# Patient Record
Sex: FEMALE | URBAN - METROPOLITAN AREA
[De-identification: names, ages, dates, MRNs, and addresses within clinical notes are randomized per-mention and may not be internally consistent; named-entity substitution may affect disease eponyms.]

---

## 2018-03-14 ENCOUNTER — SEE NOTE (OUTPATIENT)
Dept: URBAN - METROPOLITAN AREA CLINIC 32 | Facility: CLINIC | Age: 68
Setting detail: DERMATOLOGY
End: 2018-03-14

## 2018-03-14 ENCOUNTER — RX ONLY (RX ONLY)
Age: 68
End: 2018-03-14

## 2018-03-14 PROBLEM — L82.0 INFLAMED SEBORRHEIC KERATOSIS: Status: RESOLVED | Noted: 2018-03-14

## 2018-03-14 PROBLEM — L82.0 INFLAMED SEBORRHEIC KERATOSIS: Status: ACTIVE | Noted: 2018-03-14

## 2018-03-14 PROCEDURE — 17110 DESTRUCT B9 LESION 1-14: CPT

## 2018-03-14 RX ORDER — CICLOPIROX 1 G/100ML
1 APPLICATION SHAMPOO TOPICAL DAILY
Qty: 120 | Refills: 2
Start: 2018-03-14

## 2018-03-14 RX ORDER — CLOBETASOL PROPIONATE 0.5 MG/ML
1 APPLICATION SOLUTION TOPICAL QHS
Qty: 50 | Refills: 2
Start: 2018-03-14

## 2018-04-11 ENCOUNTER — FORNEY FOLLOW UP - SEE NOTE (OUTPATIENT)
Dept: URBAN - METROPOLITAN AREA CLINIC 32 | Facility: CLINIC | Age: 68
Setting detail: DERMATOLOGY
End: 2018-04-11

## 2018-04-11 PROBLEM — L82.0 INFLAMED SEBORRHEIC KERATOSIS: Status: RESOLVED | Noted: 2018-04-11

## 2018-04-11 PROBLEM — L82.0 INFLAMED SEBORRHEIC KERATOSIS: Status: ACTIVE | Noted: 2018-04-11

## 2018-04-11 PROCEDURE — 99213 OFFICE O/P EST LOW 20 MIN: CPT

## 2019-02-13 ENCOUNTER — IPL - FACE (BH) (OUTPATIENT)
Dept: URBAN - METROPOLITAN AREA CLINIC 31 | Facility: CLINIC | Age: 69
Setting detail: DERMATOLOGY
End: 2019-02-13

## 2019-02-13 DIAGNOSIS — D48.5 NEOPLASM OF UNCERTAIN BEHAVIOR OF SKIN: ICD-10-CM

## 2019-02-13 PROCEDURE — OTHER IPL LASER: OTHER

## 2020-07-24 ENCOUNTER — TELEPHONE ENCOUNTER (OUTPATIENT)
Dept: URBAN - METROPOLITAN AREA CLINIC 92 | Facility: CLINIC | Age: 70
End: 2020-07-24

## 2020-07-24 RX ORDER — POLYETHYLENE GLYCOL 3350, SODIUM CHLORIDE, SODIUM BICARBONATE AND POTASSIUM CHLORIDE 420G
AS DIRECTED KIT ORAL
Qty: 1 BOTTLE | Refills: 0 | OUTPATIENT
Start: 2020-07-24

## 2020-08-03 ENCOUNTER — TELEPHONE ENCOUNTER (OUTPATIENT)
Dept: URBAN - METROPOLITAN AREA CLINIC 98 | Facility: CLINIC | Age: 70
End: 2020-08-03

## 2020-09-23 ENCOUNTER — OFFICE VISIT (OUTPATIENT)
Dept: URBAN - METROPOLITAN AREA MEDICAL CENTER 28 | Facility: MEDICAL CENTER | Age: 70
End: 2020-09-23
Payer: MEDICARE

## 2020-09-23 DIAGNOSIS — Z86.010 H/O ADENOMATOUS POLYP OF COLON: ICD-10-CM

## 2020-09-23 DIAGNOSIS — K63.89 BACTERIAL OVERGROWTH SYNDROME: ICD-10-CM

## 2020-09-23 DIAGNOSIS — K63.5 BENIGN COLON POLYP: ICD-10-CM

## 2020-09-23 PROCEDURE — 45385 COLONOSCOPY W/LESION REMOVAL: CPT | Performed by: INTERNAL MEDICINE

## 2020-09-23 PROCEDURE — G9936 PMH PLYP/NEO CO/RECT/JUN/ANS: HCPCS | Performed by: INTERNAL MEDICINE

## 2020-09-23 RX ORDER — POLYETHYLENE GLYCOL 3350, SODIUM CHLORIDE, SODIUM BICARBONATE AND POTASSIUM CHLORIDE 420G
AS DIRECTED KIT ORAL
Qty: 1 BOTTLE | Refills: 0 | Status: ACTIVE | COMMUNITY
Start: 2020-07-24

## 2020-09-23 RX ORDER — EXTENDED PHENYTOIN SODIUM 30 MG/1
CAPSULE ORAL
Qty: 0 | Refills: 0 | Status: ACTIVE | COMMUNITY
Start: 1900-01-01 | End: 1900-01-01

## 2020-09-28 ENCOUNTER — TELEPHONE ENCOUNTER (OUTPATIENT)
Dept: URBAN - METROPOLITAN AREA CLINIC 92 | Facility: CLINIC | Age: 70
End: 2020-09-28

## 2021-06-16 ENCOUNTER — NUMB FOR INJECTABLES (OUTPATIENT)
Dept: URBAN - METROPOLITAN AREA CLINIC 32 | Facility: CLINIC | Age: 71
Setting detail: DERMATOLOGY
End: 2021-06-16

## 2021-06-16 DIAGNOSIS — L57.0 ACTINIC KERATOSIS: ICD-10-CM

## 2021-06-16 PROBLEM — L82.1 OTHER SEBORRHEIC KERATOSIS: Status: ACTIVE | Noted: 2021-06-16

## 2021-06-16 PROBLEM — D18.01 HEMANGIOMA OF SKIN AND SUBCUTANEOUS TISSUE: Status: RESOLVED | Noted: 2021-06-16

## 2021-06-16 PROBLEM — L85.3 XEROSIS CUTIS: Status: RESOLVED | Noted: 2021-06-16

## 2021-06-16 PROBLEM — L82.1 OTHER SEBORRHEIC KERATOSIS: Status: RESOLVED | Noted: 2021-06-16

## 2021-06-16 PROBLEM — L85.3 XEROSIS CUTIS: Status: ACTIVE | Noted: 2021-06-16

## 2021-06-16 PROBLEM — D18.01 HEMANGIOMA OF SKIN AND SUBCUTANEOUS TISSUE: Status: ACTIVE | Noted: 2021-06-16

## 2021-06-16 PROCEDURE — 99214 OFFICE O/P EST MOD 30 MIN: CPT

## 2022-01-19 ENCOUNTER — OUT OF OFFICE VISIT (OUTPATIENT)
Dept: URBAN - METROPOLITAN AREA MEDICAL CENTER 33 | Facility: MEDICAL CENTER | Age: 72
End: 2022-01-19
Payer: MEDICARE

## 2022-01-19 DIAGNOSIS — E87.1 ACUTE HYPONATREMIA: ICD-10-CM

## 2022-01-19 DIAGNOSIS — R79.89 ABNORMAL BILIRUBIN TEST: ICD-10-CM

## 2022-01-19 DIAGNOSIS — R50.9 ACUTE FEBRILE ILLNESS: ICD-10-CM

## 2022-01-19 DIAGNOSIS — R74.01 ABNORMAL/ELEVATED TRANSAMINASE (SGOT, AMINOTRANSFERASE): ICD-10-CM

## 2022-01-19 PROCEDURE — G8427 DOCREV CUR MEDS BY ELIG CLIN: HCPCS | Performed by: INTERNAL MEDICINE

## 2022-01-19 PROCEDURE — 99223 1ST HOSP IP/OBS HIGH 75: CPT | Performed by: INTERNAL MEDICINE

## 2022-01-20 ENCOUNTER — OUT OF OFFICE VISIT (OUTPATIENT)
Dept: URBAN - METROPOLITAN AREA MEDICAL CENTER 33 | Facility: MEDICAL CENTER | Age: 72
End: 2022-01-20
Payer: MEDICARE

## 2022-01-20 DIAGNOSIS — R74.01 ABNORMAL/ELEVATED TRANSAMINASE (SGOT, AMINOTRANSFERASE): ICD-10-CM

## 2022-01-20 PROCEDURE — 99232 SBSQ HOSP IP/OBS MODERATE 35: CPT | Performed by: INTERNAL MEDICINE

## 2022-02-09 ENCOUNTER — P2P PATIENT RECORD (OUTPATIENT)
Age: 72
End: 2022-02-09

## 2022-02-09 ENCOUNTER — DASHBOARD ENCOUNTERS (OUTPATIENT)
Age: 72
End: 2022-02-09

## 2022-02-11 ENCOUNTER — OUT OF OFFICE VISIT (OUTPATIENT)
Dept: URBAN - METROPOLITAN AREA MEDICAL CENTER 33 | Facility: MEDICAL CENTER | Age: 72
End: 2022-02-11

## 2022-02-11 ENCOUNTER — OFFICE VISIT (OUTPATIENT)
Dept: URBAN - METROPOLITAN AREA CLINIC 98 | Facility: CLINIC | Age: 72
End: 2022-02-11

## 2022-02-11 ENCOUNTER — CLAIMS CREATED FROM THE CLAIM WINDOW (OUTPATIENT)
Dept: URBAN - METROPOLITAN AREA MEDICAL CENTER 33 | Facility: MEDICAL CENTER | Age: 72
End: 2022-02-11
Payer: MEDICARE

## 2022-02-11 DIAGNOSIS — D64.89 ANEMIA DUE TO OTHER CAUSE: ICD-10-CM

## 2022-02-11 DIAGNOSIS — R93.5 ABDOMINAL ULTRASOUND, ABNORMAL: ICD-10-CM

## 2022-02-11 DIAGNOSIS — R11.2 ACUTE NAUSEA WITH NONBILIOUS VOMITING: ICD-10-CM

## 2022-02-11 DIAGNOSIS — Z12.11 AVERAGE RISK FOR CRC. DUE TO PT'S CO-MORBID STATE WITH END STAGE DEMENTIA, HIGH RISK FOR ANESTHESIA (PER NEUROLOGY); INABILITY TO TAKE A BOWEL PREP....WOULD NOT ADVISE ANY COLORECTAL CANCER SCREENING INCLUDING STOOL TEST FOR FECAL BLOOD.: ICD-10-CM

## 2022-02-11 DIAGNOSIS — R10.13 ABDOMINAL DISCOMFORT, EPIGASTRIC: ICD-10-CM

## 2022-02-11 PROCEDURE — 99222 1ST HOSP IP/OBS MODERATE 55: CPT | Performed by: INTERNAL MEDICINE

## 2022-02-11 PROCEDURE — G8427 DOCREV CUR MEDS BY ELIG CLIN: HCPCS | Performed by: INTERNAL MEDICINE

## 2022-02-11 PROCEDURE — 996 AG2 (NON BILLABLE): Performed by: PHYSICIAN ASSISTANT

## 2022-02-11 NOTE — HPI-TODAY'S VISIT:
Here to follow up for elev liver tests seen prev by Dr Mathews.   had liver test elevations on admission 1/2022 for LLL CAP and COVID 19.  alt 69 ast 74   alp 156 on admission

## 2022-02-12 ENCOUNTER — CLAIMS CREATED FROM THE CLAIM WINDOW (OUTPATIENT)
Dept: URBAN - METROPOLITAN AREA MEDICAL CENTER 33 | Facility: MEDICAL CENTER | Age: 72
End: 2022-02-12
Payer: MEDICARE

## 2022-02-12 ENCOUNTER — OUT OF OFFICE VISIT (OUTPATIENT)
Dept: URBAN - METROPOLITAN AREA MEDICAL CENTER 33 | Facility: MEDICAL CENTER | Age: 72
End: 2022-02-12

## 2022-02-12 DIAGNOSIS — R93.5 ABDOMINAL ULTRASOUND, ABNORMAL: ICD-10-CM

## 2022-02-12 DIAGNOSIS — R11.2 ACUTE NAUSEA WITH NONBILIOUS VOMITING: ICD-10-CM

## 2022-02-12 DIAGNOSIS — D64.89 ANEMIA DUE TO OTHER CAUSE: ICD-10-CM

## 2022-02-12 DIAGNOSIS — R10.13 ABDOMINAL DISCOMFORT, EPIGASTRIC: ICD-10-CM

## 2022-02-12 PROCEDURE — 99233 SBSQ HOSP IP/OBS HIGH 50: CPT | Performed by: INTERNAL MEDICINE

## 2022-02-12 PROCEDURE — 99232 SBSQ HOSP IP/OBS MODERATE 35: CPT | Performed by: INTERNAL MEDICINE

## 2022-02-14 ENCOUNTER — OUT OF OFFICE VISIT (OUTPATIENT)
Dept: URBAN - METROPOLITAN AREA MEDICAL CENTER 33 | Facility: MEDICAL CENTER | Age: 72
End: 2022-02-14
Payer: MEDICARE

## 2022-02-14 DIAGNOSIS — R63.0 ALMOST NO APPETITE: ICD-10-CM

## 2022-02-14 DIAGNOSIS — R11.2 ACUTE NAUSEA WITH NONBILIOUS VOMITING: ICD-10-CM

## 2022-02-14 DIAGNOSIS — K59.09 CHANGE IN BOWEL MOVEMENTS INTERMITTENT CONSTIPATION. URGENCY IN THE MORNING.: ICD-10-CM

## 2022-02-14 DIAGNOSIS — D64.89 ANEMIA DUE TO OTHER CAUSE: ICD-10-CM

## 2022-02-14 DIAGNOSIS — K56.7 ILEUS: ICD-10-CM

## 2022-02-14 DIAGNOSIS — R10.13 ABDOMINAL DISCOMFORT, EPIGASTRIC: ICD-10-CM

## 2022-02-14 PROCEDURE — 99232 SBSQ HOSP IP/OBS MODERATE 35: CPT | Performed by: INTERNAL MEDICINE

## 2022-02-16 ENCOUNTER — OUT OF OFFICE VISIT (OUTPATIENT)
Dept: URBAN - METROPOLITAN AREA MEDICAL CENTER 33 | Facility: MEDICAL CENTER | Age: 72
End: 2022-02-16
Payer: MEDICARE

## 2022-02-16 DIAGNOSIS — K59.09 CHANGE IN BOWEL MOVEMENTS INTERMITTENT CONSTIPATION. URGENCY IN THE MORNING.: ICD-10-CM

## 2022-02-16 DIAGNOSIS — D64.89 ANEMIA DUE TO OTHER CAUSE: ICD-10-CM

## 2022-02-16 PROCEDURE — 99232 SBSQ HOSP IP/OBS MODERATE 35: CPT | Performed by: INTERNAL MEDICINE

## 2022-02-18 ENCOUNTER — OUT OF OFFICE VISIT (OUTPATIENT)
Dept: URBAN - METROPOLITAN AREA MEDICAL CENTER 33 | Facility: MEDICAL CENTER | Age: 72
End: 2022-02-18
Payer: MEDICARE

## 2022-02-18 DIAGNOSIS — D64.89 ANEMIA DUE TO OTHER CAUSE: ICD-10-CM

## 2022-02-18 DIAGNOSIS — K56.7 ILEUS: ICD-10-CM

## 2022-02-18 DIAGNOSIS — R10.13 ABDOMINAL DISCOMFORT, EPIGASTRIC: ICD-10-CM

## 2022-02-18 DIAGNOSIS — K59.09 CHANGE IN BOWEL MOVEMENTS INTERMITTENT CONSTIPATION. URGENCY IN THE MORNING.: ICD-10-CM

## 2022-02-18 PROCEDURE — 99232 SBSQ HOSP IP/OBS MODERATE 35: CPT | Performed by: INTERNAL MEDICINE

## 2022-02-19 ENCOUNTER — OUT OF OFFICE VISIT (OUTPATIENT)
Dept: URBAN - METROPOLITAN AREA MEDICAL CENTER 33 | Facility: MEDICAL CENTER | Age: 72
End: 2022-02-19
Payer: MEDICARE

## 2022-02-19 DIAGNOSIS — R10.13 ABDOMINAL DISCOMFORT, EPIGASTRIC: ICD-10-CM

## 2022-02-19 DIAGNOSIS — K56.7 ILEUS: ICD-10-CM

## 2022-02-19 DIAGNOSIS — R11.2 ACUTE NAUSEA WITH NONBILIOUS VOMITING: ICD-10-CM

## 2022-02-19 DIAGNOSIS — K59.09 CHANGE IN BOWEL MOVEMENTS INTERMITTENT CONSTIPATION. URGENCY IN THE MORNING.: ICD-10-CM

## 2022-02-19 PROCEDURE — 99232 SBSQ HOSP IP/OBS MODERATE 35: CPT | Performed by: INTERNAL MEDICINE

## 2022-02-21 ENCOUNTER — OUT OF OFFICE VISIT (OUTPATIENT)
Dept: URBAN - METROPOLITAN AREA MEDICAL CENTER 33 | Facility: MEDICAL CENTER | Age: 72
End: 2022-02-21
Payer: MEDICARE

## 2022-02-21 DIAGNOSIS — K56.7 ILEUS: ICD-10-CM

## 2022-02-21 DIAGNOSIS — R10.13 ABDOMINAL DISCOMFORT, EPIGASTRIC: ICD-10-CM

## 2022-02-21 DIAGNOSIS — R11.2 ACUTE NAUSEA WITH NONBILIOUS VOMITING: ICD-10-CM

## 2022-02-21 DIAGNOSIS — K59.09 CHANGE IN BOWEL MOVEMENTS INTERMITTENT CONSTIPATION. URGENCY IN THE MORNING.: ICD-10-CM

## 2022-02-21 DIAGNOSIS — D64.89 ANEMIA DUE TO OTHER CAUSE: ICD-10-CM

## 2022-02-21 PROCEDURE — 99233 SBSQ HOSP IP/OBS HIGH 50: CPT | Performed by: INTERNAL MEDICINE

## 2022-02-28 ENCOUNTER — OFFICE VISIT (OUTPATIENT)
Dept: URBAN - METROPOLITAN AREA CLINIC 126 | Facility: CLINIC | Age: 72
End: 2022-02-28

## 2022-05-25 ENCOUNTER — WORRISOME GROWTH - SEE NOTE (OUTPATIENT)
Dept: URBAN - METROPOLITAN AREA CLINIC 32 | Facility: CLINIC | Age: 72
Setting detail: DERMATOLOGY
End: 2022-05-25

## 2022-05-25 ENCOUNTER — RX ONLY (RX ONLY)
Age: 72
End: 2022-05-25

## 2022-05-25 DIAGNOSIS — D48.5 NEOPLASM OF UNCERTAIN BEHAVIOR OF SKIN: ICD-10-CM

## 2022-05-25 PROCEDURE — 11102 TANGNTL BX SKIN SINGLE LES: CPT

## 2022-05-25 RX ORDER — MUPIROCIN 20 MG/G
OINTMENT TOPICAL
Qty: 22 | Refills: 1
Start: 2022-05-25

## 2022-05-25 RX ORDER — CLOBETASOL PROPIONATE 0.5 MG/G
OINTMENT TOPICAL
Qty: 45 | Refills: 1
Start: 2022-05-25

## 2022-06-22 ENCOUNTER — SKIN CANCER EXAM (OUTPATIENT)
Dept: URBAN - METROPOLITAN AREA CLINIC 32 | Facility: CLINIC | Age: 72
Setting detail: DERMATOLOGY
End: 2022-06-22

## 2022-06-22 DIAGNOSIS — B07.8 OTHER VIRAL WARTS: ICD-10-CM

## 2022-06-22 PROBLEM — L82.1 OTHER SEBORRHEIC KERATOSIS: Status: ACTIVE | Noted: 2022-06-22

## 2022-06-22 PROBLEM — L82.1 OTHER SEBORRHEIC KERATOSIS: Status: RESOLVED | Noted: 2022-06-22

## 2022-06-22 PROCEDURE — 99213 OFFICE O/P EST LOW 20 MIN: CPT

## 2024-09-10 ENCOUNTER — P2P PATIENT RECORD (OUTPATIENT)
Age: 74
End: 2024-09-10

## 2025-01-13 ENCOUNTER — LAB OUTSIDE AN ENCOUNTER (OUTPATIENT)
Dept: URBAN - METROPOLITAN AREA CLINIC 98 | Facility: CLINIC | Age: 75
End: 2025-01-13

## 2025-01-13 ENCOUNTER — OFFICE VISIT (OUTPATIENT)
Dept: URBAN - METROPOLITAN AREA CLINIC 98 | Facility: CLINIC | Age: 75
End: 2025-01-13
Payer: MEDICARE

## 2025-01-13 VITALS
HEIGHT: 60 IN | SYSTOLIC BLOOD PRESSURE: 125 MMHG | HEART RATE: 89 BPM | BODY MASS INDEX: 27.09 KG/M2 | TEMPERATURE: 96.6 F | DIASTOLIC BLOOD PRESSURE: 68 MMHG | WEIGHT: 138 LBS

## 2025-01-13 DIAGNOSIS — Z86.0101 PERSONAL HISTORY OF ADENOMATOUS AND SERRATED COLON POLYPS: ICD-10-CM

## 2025-01-13 PROCEDURE — 99203 OFFICE O/P NEW LOW 30 MIN: CPT

## 2025-01-13 RX ORDER — POLYETHYLENE GLYCOL 3350, SODIUM CHLORIDE, SODIUM BICARBONATE AND POTASSIUM CHLORIDE 420G
AS DIRECTED KIT ORAL
Qty: 1 BOTTLE | Refills: 0 | Status: ON HOLD | COMMUNITY
Start: 2020-07-24

## 2025-01-13 RX ORDER — VENLAFAXINE HYDROCHLORIDE 75 MG/1
TAKE ONE CAPSULE BY MOUTH EVERY MORNING CAPSULE, EXTENDED RELEASE ORAL
Qty: 90 UNSPECIFIED | Refills: 1 | Status: ACTIVE | COMMUNITY

## 2025-01-13 RX ORDER — LACTULOSE 10 G/15ML
SOLUTION ORAL
Qty: 1350 MILLILITER | Status: ACTIVE | COMMUNITY

## 2025-01-13 RX ORDER — EXTENDED PHENYTOIN SODIUM 30 MG/1
CAPSULE ORAL
Qty: 0 | Refills: 0 | Status: ACTIVE | COMMUNITY
Start: 1900-01-01

## 2025-01-13 RX ORDER — HYDROXYCHLOROQUINE SULFATE 200 MG/1
TAKE TWO TABLETS (400MG) BY MOUTH, ALTERNATING WITH ONE TABLET (200MG) DAILY TABLET, FILM COATED ORAL
Qty: 135 UNSPECIFIED | Refills: 1 | Status: ACTIVE | COMMUNITY

## 2025-01-13 RX ORDER — ROSUVASTATIN CALCIUM 10 MG/1
TAKE ONE TABLET BY MOUTH AT BEDTIME TABLET ORAL
Qty: 90 UNSPECIFIED | Refills: 3 | Status: ACTIVE | COMMUNITY

## 2025-01-13 NOTE — HPI-SCREENING
1/13/25- Felisa Larson, NP - 73 yo female here to schedule surveillance colonoscopy - PCP Dr. Reese Sena - Colonoscopy 9/23/2020- hyperplastic polyps, Hx SSA/TA polyps 2018. Recommend repeat colonoscopy 3 years - BM 1 per day - Stools formed - Denies bright red blood or melena in stool - No unintentional weight loss - No N/V, dysphagia - Heartburn - takes Prevacid daily

## 2025-02-11 ENCOUNTER — OFFICE VISIT (OUTPATIENT)
Dept: URBAN - METROPOLITAN AREA SURGERY CENTER 18 | Facility: SURGERY CENTER | Age: 75
End: 2025-02-11

## 2025-02-11 ENCOUNTER — CLAIMS CREATED FROM THE CLAIM WINDOW (OUTPATIENT)
Dept: URBAN - METROPOLITAN AREA CLINIC 4 | Facility: CLINIC | Age: 75
End: 2025-02-11
Payer: MEDICARE

## 2025-02-11 ENCOUNTER — CLAIMS CREATED FROM THE CLAIM WINDOW (OUTPATIENT)
Dept: URBAN - METROPOLITAN AREA SURGERY CENTER 18 | Facility: SURGERY CENTER | Age: 75
End: 2025-02-11
Payer: MEDICARE

## 2025-02-11 DIAGNOSIS — D12.2 ADENOMATOUS POLYP OF ASCENDING COLON: ICD-10-CM

## 2025-02-11 DIAGNOSIS — D12.4 BENIGN NEOPLASM OF DESCENDING COLON: ICD-10-CM

## 2025-02-11 DIAGNOSIS — Z09 ENCNTR FOR F/U EXAM AFT TRTMT FOR COND OTH THAN MALIG NEOPLM: ICD-10-CM

## 2025-02-11 DIAGNOSIS — K63.89 OTHER SPECIFIED DISEASES OF INTESTINE: ICD-10-CM

## 2025-02-11 DIAGNOSIS — D12.3 BENIGN NEOPLASM OF HEPATIC FLEXURE OF COLON: ICD-10-CM

## 2025-02-11 DIAGNOSIS — D12.3 ADENOMA OF TRANSVERSE COLON: ICD-10-CM

## 2025-02-11 DIAGNOSIS — D12.3 BENIGN NEOPLASM OF TRANSVERSE COLON: ICD-10-CM

## 2025-02-11 DIAGNOSIS — Z86.0100 PERSONAL HISTORY OF COLONIC POLYPS: ICD-10-CM

## 2025-02-11 DIAGNOSIS — D12.4 ADENOMA OF DESCENDING COLON: ICD-10-CM

## 2025-02-11 DIAGNOSIS — Z86.0100 HISTORY OF COLON POLYPS: ICD-10-CM

## 2025-02-11 PROCEDURE — 88305 TISSUE EXAM BY PATHOLOGIST: CPT | Performed by: PATHOLOGY

## 2025-02-11 PROCEDURE — 00811 ANES LWR INTST NDSC NOS: CPT | Performed by: NURSE ANESTHETIST, CERTIFIED REGISTERED

## 2025-02-11 RX ORDER — HYDROXYCHLOROQUINE SULFATE 200 MG/1
TAKE TWO TABLETS (400MG) BY MOUTH, ALTERNATING WITH ONE TABLET (200MG) DAILY TABLET, FILM COATED ORAL
Qty: 135 UNSPECIFIED | Refills: 1 | Status: ACTIVE | COMMUNITY

## 2025-02-11 RX ORDER — ROSUVASTATIN CALCIUM 10 MG/1
TAKE ONE TABLET BY MOUTH AT BEDTIME TABLET ORAL
Qty: 90 UNSPECIFIED | Refills: 3 | Status: ACTIVE | COMMUNITY

## 2025-02-11 RX ORDER — POLYETHYLENE GLYCOL 3350, SODIUM CHLORIDE, SODIUM BICARBONATE AND POTASSIUM CHLORIDE 420G
AS DIRECTED KIT ORAL
Qty: 1 BOTTLE | Refills: 0 | Status: ON HOLD | COMMUNITY
Start: 2020-07-24

## 2025-02-11 RX ORDER — VENLAFAXINE HYDROCHLORIDE 75 MG/1
TAKE ONE CAPSULE BY MOUTH EVERY MORNING CAPSULE, EXTENDED RELEASE ORAL
Qty: 90 UNSPECIFIED | Refills: 1 | Status: ACTIVE | COMMUNITY

## 2025-02-11 RX ORDER — EXTENDED PHENYTOIN SODIUM 30 MG/1
CAPSULE ORAL
Qty: 0 | Refills: 0 | Status: ACTIVE | COMMUNITY
Start: 1900-01-01

## 2025-02-11 RX ORDER — LACTULOSE 10 G/15ML
SOLUTION ORAL
Qty: 1350 MILLILITER | Status: ACTIVE | COMMUNITY

## 2025-06-04 ENCOUNTER — APPOINTMENT (OUTPATIENT)
Dept: URBAN - METROPOLITAN AREA CLINIC 208 | Age: 75
Setting detail: DERMATOLOGY
End: 2025-06-04

## 2025-06-04 DIAGNOSIS — L82.1 OTHER SEBORRHEIC KERATOSIS: ICD-10-CM

## 2025-06-04 DIAGNOSIS — L82.0 INFLAMED SEBORRHEIC KERATOSIS: ICD-10-CM

## 2025-06-04 DIAGNOSIS — D18.0 HEMANGIOMA: ICD-10-CM

## 2025-06-04 DIAGNOSIS — L57.8 OTHER SKIN CHANGES DUE TO CHRONIC EXPOSURE TO NONIONIZING RADIATION: ICD-10-CM

## 2025-06-04 DIAGNOSIS — D22 MELANOCYTIC NEVI: ICD-10-CM

## 2025-06-04 PROBLEM — D18.01 HEMANGIOMA OF SKIN AND SUBCUTANEOUS TISSUE: Status: ACTIVE | Noted: 2025-06-04

## 2025-06-04 PROBLEM — D22.72 MELANOCYTIC NEVI OF LEFT LOWER LIMB, INCLUDING HIP: Status: ACTIVE | Noted: 2025-06-04

## 2025-06-04 PROCEDURE — OTHER COUNSELING: OTHER

## 2025-06-04 PROCEDURE — OTHER DEFER: OTHER

## 2025-06-04 PROCEDURE — OTHER OBSERVATION: OTHER

## 2025-06-04 PROCEDURE — 99213 OFFICE O/P EST LOW 20 MIN: CPT

## 2025-06-04 PROCEDURE — OTHER MIPS QUALITY: OTHER

## 2025-06-04 ASSESSMENT — LOCATION DETAILED DESCRIPTION DERM
LOCATION DETAILED: LEFT ULNAR DORSAL HAND
LOCATION DETAILED: UPPER STERNUM
LOCATION DETAILED: LEFT PROXIMAL DORSAL FOREARM
LOCATION DETAILED: LEFT PROXIMAL CALF
LOCATION DETAILED: RIGHT RADIAL DORSAL HAND
LOCATION DETAILED: LEFT INFERIOR CENTRAL MALAR CHEEK
LOCATION DETAILED: RIGHT SUPERIOR LATERAL MIDBACK
LOCATION DETAILED: RIGHT PROXIMAL RADIAL DORSAL FOREARM
LOCATION DETAILED: LEFT LATERAL SUPERIOR CHEST
LOCATION DETAILED: RIGHT CENTRAL BUCCAL CHEEK

## 2025-06-04 ASSESSMENT — LOCATION SIMPLE DESCRIPTION DERM
LOCATION SIMPLE: CHEST
LOCATION SIMPLE: LEFT FOREARM
LOCATION SIMPLE: RIGHT FOREARM
LOCATION SIMPLE: RIGHT LOWER BACK
LOCATION SIMPLE: LEFT HAND
LOCATION SIMPLE: RIGHT HAND
LOCATION SIMPLE: LEFT CALF
LOCATION SIMPLE: LEFT CHEEK
LOCATION SIMPLE: RIGHT CHEEK

## 2025-06-04 ASSESSMENT — LOCATION ZONE DERM
LOCATION ZONE: LEG
LOCATION ZONE: FACE
LOCATION ZONE: HAND
LOCATION ZONE: ARM
LOCATION ZONE: TRUNK